# Patient Record
Sex: FEMALE | Race: OTHER | HISPANIC OR LATINO | URBAN - METROPOLITAN AREA
[De-identification: names, ages, dates, MRNs, and addresses within clinical notes are randomized per-mention and may not be internally consistent; named-entity substitution may affect disease eponyms.]

---

## 2018-01-01 ENCOUNTER — EMERGENCY (EMERGENCY)
Facility: HOSPITAL | Age: 83
LOS: 1 days | End: 2018-01-01
Attending: EMERGENCY MEDICINE
Payer: COMMERCIAL

## 2018-01-01 DIAGNOSIS — Z95.0 PRESENCE OF CARDIAC PACEMAKER: Chronic | ICD-10-CM

## 2018-01-01 PROCEDURE — 82962 GLUCOSE BLOOD TEST: CPT

## 2018-01-01 PROCEDURE — 99291 CRITICAL CARE FIRST HOUR: CPT

## 2018-09-22 NOTE — ED PROVIDER NOTE - PHYSICAL EXAMINATION
Combitube in place. No signs of significant trauma. Pupils 4mm equal nonreactive. BVM ventilation. IO line left lower leg, successful placement.

## 2018-09-22 NOTE — ED PROVIDER NOTE - PROGRESS NOTE DETAILS
-D/w M.E.'s office and case declined by Joseph Cutler; spoke with Reggie.  -Also informed PMD, Dr. Allan.

## 2018-09-22 NOTE — ED PROVIDER NOTE - PMH
DM (diabetes mellitus)    Heart failure    HLD (hyperlipidemia)    HTN (hypertension)    Liver cancer  Stage 4  PE (pulmonary thromboembolism)

## 2018-09-22 NOTE — ED PROVIDER NOTE - OBJECTIVE STATEMENT
82 y/o F PMHx of stage 4 liver cancer, DM, HTN, HLD, heart failure with known AICD, pacemaker implanted, Hx of PE, not on anticoagulation brought in by EMS from nursing home in cardiac arrest. ACLS was in progress since approximately 1205. Chest compressions started by nursing home staff at 1200 upon witnessed cardiac arrest. No report of trauma. Initially rhythm asystole and then PEA. Approximately 15 minutes prior to arrival, patient had brief transient return of ROSC. PTA EMS gave pt Epinephrine x4, bicarbonate x2, calcium x1. Combitube and IO line were successfully placed by EMS. Daughter, Chandan Schaeffer, arrived during code and was present when pt was pronounced.

## 2018-09-22 NOTE — ED PROVIDER NOTE - CRITICAL CARE PROVIDED
additional history taking/documentation/direct patient care (not related to procedure)/consult w/ pt's family directly relating to pts condition

## 2018-09-22 NOTE — ED PROVIDER NOTE - CARE PLAN
Principal Discharge DX:	Cardiopulmonary arrest  Secondary Diagnosis:	Malignant neoplasm of liver, unspecified liver malignancy type

## 2018-09-22 NOTE — ED PROVIDER NOTE - MEDICAL DECISION MAKING DETAILS
82 y/o woman, h/o multiple past medical problems including stage 4 liver cancer, heart failure with reported EF 20%, presented in cardiac arrest since about 12:00 pm, witnessed arrest, astystole and then PEA managed with ACLS protocol, Combitube intubation, meds via IO prior to arrival; ACLS continued here and defibrillation attempts made and amiodarone given due to V-fib on monitor, however unsuccessful.  Pt was pronounced dead at 1:10 pm with no spontaneous organized cardiac activity on bedside echocardiogram, with Pt's daughter present.

## 2018-09-22 NOTE — ED ADULT NURSE NOTE - OBJECTIVE STATEMENT
From [facility] home cardiac arrest notification, unresponsive ACLS in progress on arrival to ED Left lower extremity IO.  [id number]